# Patient Record
Sex: FEMALE
[De-identification: names, ages, dates, MRNs, and addresses within clinical notes are randomized per-mention and may not be internally consistent; named-entity substitution may affect disease eponyms.]

---

## 2023-05-30 PROBLEM — Z00.00 ENCOUNTER FOR PREVENTIVE HEALTH EXAMINATION: Status: ACTIVE | Noted: 2023-05-30

## 2023-06-01 ENCOUNTER — APPOINTMENT (OUTPATIENT)
Dept: UROLOGY | Facility: CLINIC | Age: 28
End: 2023-06-01
Payer: MEDICAID

## 2023-06-01 VITALS
DIASTOLIC BLOOD PRESSURE: 59 MMHG | RESPIRATION RATE: 14 BRPM | BODY MASS INDEX: 19.83 KG/M2 | HEART RATE: 49 BPM | TEMPERATURE: 98.1 F | WEIGHT: 119 LBS | OXYGEN SATURATION: 100 % | HEIGHT: 65 IN | SYSTOLIC BLOOD PRESSURE: 97 MMHG

## 2023-06-01 DIAGNOSIS — R39.9 UNSPECIFIED SYMPTOMS AND SIGNS INVOLVING THE GENITOURINARY SYSTEM: ICD-10-CM

## 2023-06-01 PROCEDURE — 99203 OFFICE O/P NEW LOW 30 MIN: CPT

## 2023-06-02 LAB
APPEARANCE: CLEAR
BACTERIA: NEGATIVE /HPF
BILIRUBIN URINE: NEGATIVE
BLOOD URINE: NEGATIVE
CAST: 0 /LPF
COLOR: YELLOW
EPITHELIAL CELLS: 2 /HPF
GLUCOSE QUALITATIVE U: NEGATIVE MG/DL
KETONES URINE: NEGATIVE MG/DL
LEUKOCYTE ESTERASE URINE: NEGATIVE
MICROSCOPIC-UA: NORMAL
NITRITE URINE: NEGATIVE
PH URINE: 7.5
PROTEIN URINE: NEGATIVE MG/DL
RED BLOOD CELLS URINE: 0 /HPF
SPECIFIC GRAVITY URINE: 1.01
UROBILINOGEN URINE: 0.2 MG/DL
WHITE BLOOD CELLS URINE: 0 /HPF

## 2023-06-02 NOTE — HISTORY OF PRESENT ILLNESS
[FreeTextEntry1] : Language: English\par Date of First visit: 06/01/2023 \par Accompanied by: self\par Contact info: \par Referring Provider/PCP: Dr. Alberto Uribe GYN\par Fax: \par \par \par CC/ Problem List:\par foul urinary odor\par ===============================================================================\par FIRST VISIT / Summary:\par Very pleasant 27 year old F here for foul smelling urine. \par \par She states that her symptoms have been ongoing since August of 2022 where she would have curd like discharge and would treat with OTC medications with resolution. More recently in March of 2023 more frequent similar symptoms of yeast infection, UTI, odor which warranted a visit with her gynecologist where urine samples resulted with candida treated with Diflucan and tioconazole and Ureaplasma with doxycycline. She completed all treatments. \par \par Today she admits to thick vaginal discharge (not curd) and odor (not fishy). Next GYN appointment 06/2023\par \par She denies dysuria, hematuria, frequency, same partner \par -------------------------------------------------------------------------------------------\par INTERVAL VISITS:\par \par ===============================================================================\par \par PMH: yeast infection, UTI 2022\par Meds: birth control\par All: none\par FHx: NONE\par SocHx: nonsmoker, no ETOH, single, no children \par \par PSH: none\par \par \par ROS: Review of Systems is as per HPI unless otherwise denoted below\par \par \par ===============================================================================\par DATA: \par \par LABS (SELECTED):---------------------------------------------------------------------------------------------------\par \par \par RADS:-------------------------------------------------------------------------------------------------------------------\par \par \par PATHOLOGY/CYTOLOGY:-------------------------------------------------------------------------------------------\par \par \par VOIDING STUDIES: ----------------------------------------------------------------------------------------------------\par \par \par STONE STUDIES: (Analysis/LLSA)----------------------------------------------------------------------------------\par \par \par PROCEDURES: -----------------------------------------------------------------------------------------------\par \par \par \par \par ===============================================================================\par \par PHYSICAL EXAM:\par \par GEN: AAOx3, NAD\par \par PSYCH: Appropriate Behavior, Affect Congruent\par \par Lungs: No labored breathing\par \par GAIT: Gait normal, Stability good\par \par ABD: no suprapubic or CVAT\par \par \par  FOCUSED: ----------------------------------------------------------------------------------------------------------------\par declined - had recently with gynecologist\par \par =======================================================================================\par DISCUSSION: \par =======================================================================================\par ASSESSMENT and PLAN\par \par \par 1. Foul urine odor\par UA, UCx\par OTC probiotic suppository weekly if testing normal. She states odor not vaginal only in urine however very safe and may prevent further mycoplasma/BV etc\par \par Follow up as needed\par \par \par =======================================================================================\par \par Thank you for allowing me to assist in the care of your patient. Should you have any questions please do not hesitate to reach out to me.\par \par \par Carl Vaca MD                                                            \par [facility]\par [facility]\par [facility]:\par [address], [address]   [place] [address]    \par T: [phone]    \par F: [phone]    \par \par [facility]:\par [address], [address]\[place] [address]\par T: [phone]\par F: [phone]

## 2023-06-03 LAB — BACTERIA UR CULT: NORMAL

## 2023-06-06 LAB
M GENITALIUM DNA UR QL NAA+PROBE: NEGATIVE
M HOMINIS DNA SPEC QL NAA+PROBE: NEGATIVE
UREAPLASMA DNA SPEC QL NAA+PROBE: NEGATIVE